# Patient Record
Sex: MALE | Race: BLACK OR AFRICAN AMERICAN | NOT HISPANIC OR LATINO | URBAN - METROPOLITAN AREA
[De-identification: names, ages, dates, MRNs, and addresses within clinical notes are randomized per-mention and may not be internally consistent; named-entity substitution may affect disease eponyms.]

---

## 2017-04-28 PROBLEM — 14760008 CONSTIPATION: Status: ACTIVE | Noted: 2017-04-28

## 2017-04-28 PROBLEM — 68496003 POLYP OF COLON: Status: ACTIVE | Noted: 2017-04-28

## 2017-04-28 PROBLEM — 88111009 CHANGE IN BOWEL HABIT: Status: ACTIVE | Noted: 2017-04-28

## 2021-06-29 ENCOUNTER — OFFICE VISIT (OUTPATIENT)
Dept: URBAN - METROPOLITAN AREA CLINIC 27 | Facility: CLINIC | Age: 71
End: 2021-06-29

## 2021-06-29 PROBLEM — 59621000 ESSENTIAL HYPERTENSION: Status: ACTIVE | Noted: 2021-06-29

## 2021-06-29 PROBLEM — 313436004 TYPE II DIABETES MELLITUS WITHOUT COMPLICATION: Status: ACTIVE | Noted: 2021-06-29

## 2021-06-29 PROBLEM — 449681000124101 LONG-TERM CURRENT USE OF ANTIPLATELET DRUG: Status: ACTIVE | Noted: 2021-06-29

## 2021-06-29 PROBLEM — 442182001 IMAGING OF GASTROINTESTINAL TRACT ABNORMAL: Status: ACTIVE | Noted: 2021-06-29

## 2021-06-29 PROBLEM — 428283002 HISTORY OF POLYP OF COLON (SITUATION): Status: ACTIVE | Noted: 2021-06-29

## 2021-06-30 ENCOUNTER — LAB OUTSIDE AN ENCOUNTER (OUTPATIENT)
Dept: URBAN - METROPOLITAN AREA CLINIC 121 | Facility: CLINIC | Age: 71
End: 2021-06-30

## 2021-06-30 LAB — TSH: 1.11

## 2022-04-30 ENCOUNTER — TELEPHONE ENCOUNTER (OUTPATIENT)
Dept: URBAN - METROPOLITAN AREA CLINIC 121 | Facility: CLINIC | Age: 72
End: 2022-04-30

## 2022-04-30 RX ORDER — PIOGLITAZONE HCL 30 MG
QD TABLET ORAL
OUTPATIENT
Start: 2014-12-08

## 2022-04-30 RX ORDER — PIOGLITAZONE HCL 30 MG
QD TABLET ORAL
OUTPATIENT
Start: 2014-12-08 | End: 2017-04-28

## 2022-05-01 ENCOUNTER — TELEPHONE ENCOUNTER (OUTPATIENT)
Dept: URBAN - METROPOLITAN AREA CLINIC 121 | Facility: CLINIC | Age: 72
End: 2022-05-01

## 2022-05-01 RX ORDER — SITAGLIPTIN PHOSPHATE 100 MG
QD TABLET ORAL
Status: ACTIVE | COMMUNITY
Start: 2014-12-08

## 2022-05-01 RX ORDER — HYDROCHLOROTHIAZIDE 25 MG/1
QD TABLET ORAL
Status: ACTIVE | COMMUNITY
Start: 2014-12-08

## 2022-05-01 RX ORDER — AMLODIPINE BESYLATE 10 MG/1
QD TABLET ORAL
Status: ACTIVE | COMMUNITY
Start: 2014-12-08

## 2022-05-01 RX ORDER — OLMESARTAN MEDOXOMIL 20 MG/1
QD TABLET, FILM COATED ORAL
Status: ACTIVE | COMMUNITY
Start: 2014-12-08

## 2022-05-01 RX ORDER — BACILLUS COAGULANS/INULIN 1B-250 MG
CAPSULE ORAL
Status: ACTIVE | COMMUNITY
Start: 2021-07-08

## 2022-05-01 RX ORDER — ELECTROLYTES/DEXTROSE
SOLUTION, ORAL ORAL
Status: ACTIVE | COMMUNITY
Start: 2021-07-08

## 2022-05-01 RX ORDER — PIOGLITAZONE 30 MG/1
TABLET ORAL
Status: ACTIVE | COMMUNITY
Start: 2021-06-29

## 2022-05-01 RX ORDER — ASPIRIN 325 MG/1
QD TABLET ORAL
Status: ACTIVE | COMMUNITY
Start: 2015-01-08

## 2022-05-01 RX ORDER — STANDARDIZED SENNA CONCENTRATE AND DOCUSATE SODIUM 8.6; 5 MG/1; MG/1
TABLET ORAL
Status: ACTIVE | COMMUNITY
Start: 2017-04-28

## 2022-05-01 RX ORDER — SIMVASTATIN 20 MG/1
QD TABLET, FILM COATED ORAL
Status: ACTIVE | COMMUNITY
Start: 2014-12-08

## 2022-05-01 RX ORDER — POLYETHYLENE GLYCOL-3350 AND ELECTROLYTES WITH FLAVOR PACK 240; 5.84; 2.98; 6.72; 22.72 G/278.26G; G/278.26G; G/278.26G; G/278.26G; G/278.26G
MIX AND USE AS DIRECTED POWDER, FOR SOLUTION ORAL
Status: ACTIVE | COMMUNITY
Start: 2018-10-10

## 2022-10-28 ENCOUNTER — DASHBOARD ENCOUNTERS (OUTPATIENT)
Age: 72
End: 2022-10-28

## 2022-10-28 ENCOUNTER — WEB ENCOUNTER (OUTPATIENT)
Dept: URBAN - METROPOLITAN AREA CLINIC 84 | Facility: CLINIC | Age: 72
End: 2022-10-28

## 2022-10-28 ENCOUNTER — OFFICE VISIT (OUTPATIENT)
Dept: URBAN - METROPOLITAN AREA CLINIC 84 | Facility: CLINIC | Age: 72
End: 2022-10-28
Payer: MEDICARE

## 2022-10-28 VITALS
HEART RATE: 62 BPM | WEIGHT: 261.6 LBS | BODY MASS INDEX: 35.43 KG/M2 | DIASTOLIC BLOOD PRESSURE: 66 MMHG | SYSTOLIC BLOOD PRESSURE: 131 MMHG | HEIGHT: 72 IN | TEMPERATURE: 96.9 F

## 2022-10-28 DIAGNOSIS — K59.04 CHRONIC IDIOPATHIC CONSTIPATION: ICD-10-CM

## 2022-10-28 PROCEDURE — 99214 OFFICE O/P EST MOD 30 MIN: CPT | Performed by: INTERNAL MEDICINE

## 2022-10-28 RX ORDER — ASPIRIN 325 MG/1
QD TABLET ORAL
Status: ACTIVE | COMMUNITY
Start: 2015-01-08

## 2022-10-28 RX ORDER — BACILLUS COAGULANS/INULIN 1B-250 MG
CAPSULE ORAL
Status: ACTIVE | COMMUNITY
Start: 2021-07-08

## 2022-10-28 RX ORDER — PIOGLITAZONE 30 MG/1
TABLET ORAL
Status: ACTIVE | COMMUNITY
Start: 2021-06-29

## 2022-10-28 RX ORDER — SITAGLIPTIN PHOSPHATE 100 MG
QD TABLET ORAL
Status: ACTIVE | COMMUNITY
Start: 2014-12-08

## 2022-10-28 RX ORDER — HYDROCHLOROTHIAZIDE 25 MG/1
QD TABLET ORAL
Status: ACTIVE | COMMUNITY
Start: 2014-12-08

## 2022-10-28 RX ORDER — STANDARDIZED SENNA CONCENTRATE AND DOCUSATE SODIUM 8.6; 5 MG/1; MG/1
TABLET ORAL
Status: ACTIVE | COMMUNITY
Start: 2017-04-28

## 2022-10-28 RX ORDER — SIMVASTATIN 20 MG/1
QD TABLET, FILM COATED ORAL
Status: ACTIVE | COMMUNITY
Start: 2014-12-08

## 2022-10-28 RX ORDER — AMLODIPINE BESYLATE 10 MG/1
QD TABLET ORAL
Status: ACTIVE | COMMUNITY
Start: 2014-12-08

## 2022-10-28 RX ORDER — ELECTROLYTES/DEXTROSE
SOLUTION, ORAL ORAL
Status: ACTIVE | COMMUNITY
Start: 2021-07-08

## 2022-10-28 NOTE — HPI-TODAY'S VISIT:
Patient new to our practice.  Patient has been suffering from constipation and bloat.  He's ahd these symptoms for 2-3 years.  He has 1-2 BM's a week.  He has strain and poor emptying.  He denies LGI bleed or melena.  He denies abdominal pain.  He denies annorexia or weight loss. He has developed GERD recently for the past 2-3 months.  He has heartburn.  He did reposnd to PTC antacids.  He denies N/V/dysphagia.  He responds to Senna. He ahs not tried Miralax or prescription laxatives.  His last colonsocopy was in 10/2018 with a 5 year recall

## 2022-11-04 PROBLEM — 82934008 CHRONIC IDIOPATHIC CONSTIPATION: Status: ACTIVE | Noted: 2022-10-28

## 2022-11-16 ENCOUNTER — TELEPHONE ENCOUNTER (OUTPATIENT)
Dept: URBAN - METROPOLITAN AREA CLINIC 84 | Facility: CLINIC | Age: 72
End: 2022-11-16